# Patient Record
Sex: FEMALE | Race: WHITE | NOT HISPANIC OR LATINO | Employment: OTHER | URBAN - METROPOLITAN AREA
[De-identification: names, ages, dates, MRNs, and addresses within clinical notes are randomized per-mention and may not be internally consistent; named-entity substitution may affect disease eponyms.]

---

## 2023-08-09 LAB
ALANINE AMINOTRANSFERASE (SGPT) (U/L) IN SER/PLAS: 20 U/L (ref 7–45)
ALBUMIN (G/DL) IN SER/PLAS: 4.4 G/DL (ref 3.4–5)
ALKALINE PHOSPHATASE (U/L) IN SER/PLAS: 54 U/L (ref 33–136)
ANION GAP IN SER/PLAS: 14 MMOL/L (ref 10–20)
ASPARTATE AMINOTRANSFERASE (SGOT) (U/L) IN SER/PLAS: 21 U/L (ref 9–39)
BASOPHILS (10*3/UL) IN BLOOD BY AUTOMATED COUNT: 0.07 X10E9/L (ref 0–0.1)
BASOPHILS/100 LEUKOCYTES IN BLOOD BY AUTOMATED COUNT: 1.4 % (ref 0–2)
BILIRUBIN TOTAL (MG/DL) IN SER/PLAS: 0.8 MG/DL (ref 0–1.2)
CALCIDIOL (25 OH VITAMIN D3) (NG/ML) IN SER/PLAS: 76 NG/ML
CALCIUM (MG/DL) IN SER/PLAS: 10.3 MG/DL (ref 8.6–10.3)
CARBON DIOXIDE, TOTAL (MMOL/L) IN SER/PLAS: 30 MMOL/L (ref 21–32)
CHLORIDE (MMOL/L) IN SER/PLAS: 98 MMOL/L (ref 98–107)
CHOLESTEROL (MG/DL) IN SER/PLAS: 152 MG/DL (ref 0–199)
CHOLESTEROL IN HDL (MG/DL) IN SER/PLAS: 83.5 MG/DL
CHOLESTEROL/HDL RATIO: 1.8
CREATININE (MG/DL) IN SER/PLAS: 0.92 MG/DL (ref 0.5–1.05)
EOSINOPHILS (10*3/UL) IN BLOOD BY AUTOMATED COUNT: 0.19 X10E9/L (ref 0–0.4)
EOSINOPHILS/100 LEUKOCYTES IN BLOOD BY AUTOMATED COUNT: 3.9 % (ref 0–6)
ERYTHROCYTE DISTRIBUTION WIDTH (RATIO) BY AUTOMATED COUNT: 14.6 % (ref 11.5–14.5)
ERYTHROCYTE MEAN CORPUSCULAR HEMOGLOBIN CONCENTRATION (G/DL) BY AUTOMATED: 33 G/DL (ref 32–36)
ERYTHROCYTE MEAN CORPUSCULAR VOLUME (FL) BY AUTOMATED COUNT: 91 FL (ref 80–100)
ERYTHROCYTES (10*6/UL) IN BLOOD BY AUTOMATED COUNT: 4.28 X10E12/L (ref 4–5.2)
GFR FEMALE: 65 ML/MIN/1.73M2
GLUCOSE (MG/DL) IN SER/PLAS: 99 MG/DL (ref 74–99)
HEMATOCRIT (%) IN BLOOD BY AUTOMATED COUNT: 39.1 % (ref 36–46)
HEMOGLOBIN (G/DL) IN BLOOD: 12.9 G/DL (ref 12–16)
IMMATURE GRANULOCYTES/100 LEUKOCYTES IN BLOOD BY AUTOMATED COUNT: 0.2 % (ref 0–0.9)
LDL: 56 MG/DL (ref 0–99)
LEUKOCYTES (10*3/UL) IN BLOOD BY AUTOMATED COUNT: 4.9 X10E9/L (ref 4.4–11.3)
LYMPHOCYTES (10*3/UL) IN BLOOD BY AUTOMATED COUNT: 1.05 X10E9/L (ref 0.8–3)
LYMPHOCYTES/100 LEUKOCYTES IN BLOOD BY AUTOMATED COUNT: 21.6 % (ref 13–44)
MONOCYTES (10*3/UL) IN BLOOD BY AUTOMATED COUNT: 0.54 X10E9/L (ref 0.05–0.8)
MONOCYTES/100 LEUKOCYTES IN BLOOD BY AUTOMATED COUNT: 11.1 % (ref 2–10)
NEUTROPHILS (10*3/UL) IN BLOOD BY AUTOMATED COUNT: 3.01 X10E9/L (ref 1.6–5.5)
NEUTROPHILS/100 LEUKOCYTES IN BLOOD BY AUTOMATED COUNT: 61.8 % (ref 40–80)
PLATELETS (10*3/UL) IN BLOOD AUTOMATED COUNT: 150 X10E9/L (ref 150–450)
POTASSIUM (MMOL/L) IN SER/PLAS: 3.6 MMOL/L (ref 3.5–5.3)
PROTEIN TOTAL: 7.3 G/DL (ref 6.4–8.2)
SODIUM (MMOL/L) IN SER/PLAS: 138 MMOL/L (ref 136–145)
THYROTROPIN (MIU/L) IN SER/PLAS BY DETECTION LIMIT <= 0.05 MIU/L: 0.62 MIU/L (ref 0.44–3.98)
TRIGLYCERIDE (MG/DL) IN SER/PLAS: 65 MG/DL (ref 0–149)
UREA NITROGEN (MG/DL) IN SER/PLAS: 20 MG/DL (ref 6–23)
VLDL: 13 MG/DL (ref 0–40)

## 2023-08-10 LAB
APPEARANCE, URINE: CLEAR
BACTERIA, URINE: ABNORMAL /HPF
BILIRUBIN, URINE: NEGATIVE
BLOOD, URINE: NEGATIVE
COLOR, URINE: YELLOW
GLUCOSE, URINE: NEGATIVE MG/DL
KETONES, URINE: NEGATIVE MG/DL
LEUKOCYTE ESTERASE, URINE: ABNORMAL
MUCUS, URINE: ABNORMAL /LPF
NITRITE, URINE: NEGATIVE
PH, URINE: 6 (ref 5–8)
PROTEIN, URINE: NEGATIVE MG/DL
RBC, URINE: 2 /HPF (ref 0–5)
SPECIFIC GRAVITY, URINE: 1.01 (ref 1–1.03)
SQUAMOUS EPITHELIAL CELLS, URINE: <1 /HPF
UROBILINOGEN, URINE: <2 MG/DL (ref 0–1.9)
WBC, URINE: 36 /HPF (ref 0–5)

## 2023-12-28 RX ORDER — IPRATROPIUM BROMIDE AND ALBUTEROL 20; 100 UG/1; UG/1
SPRAY, METERED RESPIRATORY (INHALATION)
COMMUNITY
Start: 2017-03-13

## 2023-12-28 RX ORDER — ATORVASTATIN CALCIUM 40 MG/1
1 TABLET, FILM COATED ORAL NIGHTLY
COMMUNITY
Start: 2016-12-30 | End: 2024-02-19 | Stop reason: SDUPTHER

## 2023-12-28 RX ORDER — MUPIROCIN 20 MG/G
OINTMENT TOPICAL
COMMUNITY
Start: 2021-08-04

## 2023-12-28 RX ORDER — FLUTICASONE FUROATE AND VILANTEROL 100; 25 UG/1; UG/1
1 POWDER RESPIRATORY (INHALATION) DAILY
COMMUNITY
Start: 2017-03-13

## 2023-12-28 RX ORDER — FLUTICASONE PROPIONATE 50 MCG
1 SPRAY, SUSPENSION (ML) NASAL 2 TIMES DAILY
COMMUNITY
Start: 2017-03-31

## 2023-12-28 RX ORDER — HYDROCHLOROTHIAZIDE 25 MG/1
1 TABLET ORAL DAILY
COMMUNITY
Start: 2022-06-13 | End: 2024-01-15

## 2023-12-28 RX ORDER — VITAMIN E MIXED 400 UNIT
1 CAPSULE ORAL DAILY
COMMUNITY

## 2023-12-28 RX ORDER — ISOSORBIDE MONONITRATE 30 MG/1
2 TABLET, EXTENDED RELEASE ORAL DAILY
COMMUNITY
Start: 2016-08-02

## 2023-12-28 RX ORDER — CARVEDILOL 25 MG/1
1 TABLET ORAL 2 TIMES DAILY
COMMUNITY
Start: 2021-08-03

## 2023-12-28 RX ORDER — VIT C/E/ZN/COPPR/LUTEIN/ZEAXAN 250MG-90MG
CAPSULE ORAL
COMMUNITY

## 2023-12-28 RX ORDER — IPRATROPIUM BROMIDE AND ALBUTEROL SULFATE 2.5; .5 MG/3ML; MG/3ML
SOLUTION RESPIRATORY (INHALATION)
COMMUNITY
Start: 2017-02-06

## 2023-12-28 RX ORDER — LOSARTAN POTASSIUM 100 MG/1
1 TABLET ORAL DAILY
COMMUNITY

## 2023-12-28 RX ORDER — ASPIRIN 81 MG/1
1 TABLET ORAL DAILY
COMMUNITY

## 2023-12-28 RX ORDER — CALCIUM CARBONATE 600 MG
2 TABLET ORAL DAILY
COMMUNITY

## 2024-01-09 DIAGNOSIS — I10 ESSENTIAL HYPERTENSION: Primary | ICD-10-CM

## 2024-01-15 RX ORDER — HYDROCHLOROTHIAZIDE 25 MG/1
25 TABLET ORAL DAILY
Qty: 100 TABLET | Refills: 2 | Status: SHIPPED | OUTPATIENT
Start: 2024-01-15 | End: 2024-02-19 | Stop reason: SDUPTHER

## 2024-02-19 ENCOUNTER — OFFICE VISIT (OUTPATIENT)
Dept: CARDIOLOGY | Facility: CLINIC | Age: 74
End: 2024-02-19
Payer: MEDICARE

## 2024-02-19 VITALS
HEIGHT: 63 IN | DIASTOLIC BLOOD PRESSURE: 72 MMHG | SYSTOLIC BLOOD PRESSURE: 146 MMHG | HEART RATE: 62 BPM | WEIGHT: 158 LBS | BODY MASS INDEX: 28 KG/M2

## 2024-02-19 DIAGNOSIS — I10 ESSENTIAL HYPERTENSION, BENIGN: Primary | ICD-10-CM

## 2024-02-19 DIAGNOSIS — J44.9 CHRONIC OBSTRUCTIVE PULMONARY DISEASE, UNSPECIFIED COPD TYPE (MULTI): ICD-10-CM

## 2024-02-19 DIAGNOSIS — J45.909 ASTHMA, UNSPECIFIED ASTHMA SEVERITY, UNSPECIFIED WHETHER COMPLICATED, UNSPECIFIED WHETHER PERSISTENT (HHS-HCC): ICD-10-CM

## 2024-02-19 DIAGNOSIS — I10 ESSENTIAL HYPERTENSION: ICD-10-CM

## 2024-02-19 DIAGNOSIS — I51.81 TAKOTSUBO CARDIOMYOPATHY: ICD-10-CM

## 2024-02-19 DIAGNOSIS — Z87.891 FORMER SMOKER: ICD-10-CM

## 2024-02-19 DIAGNOSIS — E78.2 MIXED HYPERLIPIDEMIA: ICD-10-CM

## 2024-02-19 PROCEDURE — 3078F DIAST BP <80 MM HG: CPT | Performed by: INTERNAL MEDICINE

## 2024-02-19 PROCEDURE — 99214 OFFICE O/P EST MOD 30 MIN: CPT | Performed by: INTERNAL MEDICINE

## 2024-02-19 PROCEDURE — 1159F MED LIST DOCD IN RCRD: CPT | Performed by: INTERNAL MEDICINE

## 2024-02-19 PROCEDURE — 1036F TOBACCO NON-USER: CPT | Performed by: INTERNAL MEDICINE

## 2024-02-19 PROCEDURE — 3077F SYST BP >= 140 MM HG: CPT | Performed by: INTERNAL MEDICINE

## 2024-02-19 RX ORDER — AMLODIPINE BESYLATE 5 MG/1
5 TABLET ORAL DAILY
Qty: 30 TABLET | Refills: 11 | Status: SHIPPED | OUTPATIENT
Start: 2024-02-19 | End: 2025-02-18

## 2024-02-19 RX ORDER — HYDROCHLOROTHIAZIDE 25 MG/1
25 TABLET ORAL DAILY
Qty: 100 TABLET | Refills: 2 | Status: SHIPPED | OUTPATIENT
Start: 2024-02-19

## 2024-02-19 RX ORDER — ATORVASTATIN CALCIUM 40 MG/1
40 TABLET, FILM COATED ORAL NIGHTLY
Qty: 90 TABLET | Refills: 3 | Status: SHIPPED | OUTPATIENT
Start: 2024-02-19 | End: 2025-02-18

## 2024-02-19 NOTE — PATIENT INSTRUCTIONS
START AMLODIPINE 5 MG DAILY    FOLLOW UP IN 3-4 WEEKS WITH NP FOR BP CHECK    FOLLOW UP IN 6 MONTHS WITH DR BULL

## 2024-02-19 NOTE — PROGRESS NOTES
Patient:  Gi Moss  YOB: 1950  MRN: [id number]       Chief Complaint/Active Symptoms:       Gi Moss is a 73 y.o. female who returns today for cardiac follow-up.  Patient denies any new unusual symptoms.  She is on her usual medicines.  Her blood pressure still remains elevated however.  We are going to begin on Norvasc 5 mg for better control.  Will have a blood pressure check done in a few weeks and then see me in 6 months.  She has no other complaints.  She does wear her oxygen full-time due to her end-stage lung disease.      Objective:     Vitals:    02/19/24 1225   BP: 146/72   Pulse: 62       Vitals:    02/19/24 1225   Weight: 71.7 kg (158 lb)       Allergies:     Allergies   Allergen Reactions    Lisinopril Unknown          Medications:     Current Outpatient Medications   Medication Instructions    aspirin 81 mg EC tablet 1 tablet, oral, Daily    atorvastatin (Lipitor) 40 mg tablet 1 tablet, oral, Nightly    calcium carbonate 600 mg calcium (1,500 mg) tablet 2 tablets, oral, Daily    carvedilol (Coreg) 25 mg tablet 1 tablet, oral, 2 times daily    cholecalciferol (Vitamin D-3) 25 MCG (1000 UT) capsule oral    fish oil concentrate (Omega-3) 120-180 mg capsule 1 capsule, oral, 2 times daily    fluticasone (Flonase) 50 mcg/actuation nasal spray 1 spray, nasal, 2 times daily    fluticasone furoate-vilanteroL (Breo Ellipta) 100-25 mcg/dose inhaler 1 puff, inhalation, Daily    hydroCHLOROthiazide (HYDRODIURIL) 25 mg, oral, Daily    ipratropium-albuteroL (Combivent Respimat)  mcg/actuation inhaler inhalation    ipratropium-albuteroL (Duo-Neb) 0.5-2.5 mg/3 mL nebulizer solution inhalation    isosorbide mononitrate ER (Imdur) 30 mg 24 hr tablet 2 tablets, oral, Daily    losartan (Cozaar) 100 mg tablet 1 tablet, oral, Daily    mupirocin (Bactroban) 2 % ointment as directed prn    vitamin E 90 mg (200 unit) capsule 1 capsule, oral, Daily       Physical Examination:   Constitutional:        "Appearance: Healthy appearance. Not in distress.   Neck:      Vascular: No JVR. JVD normal.   Pulmonary:      Effort: Pulmonary effort is normal.      Breath sounds: Normal breath sounds. No wheezing. No rhonchi. No rales.   Chest:      Chest wall: Not tender to palpatation.   Cardiovascular:      PMI at left midclavicular line. Normal rate. Regular rhythm. Normal S1. Normal S2.       Murmurs: There is no murmur.      No gallop.  No click. No rub.   Pulses:     Intact distal pulses.   Edema:     Peripheral edema absent.   Abdominal:      General: Bowel sounds are normal.      Palpations: Abdomen is soft.      Tenderness: There is no abdominal tenderness.   Musculoskeletal: Normal range of motion.         General: No tenderness. Skin:     General: Skin is warm and dry.   Neurological:      General: No focal deficit present.      Mental Status: Alert and oriented to person, place and time.            Lab:     CBC:   Lab Results   Component Value Date    WBC 4.9 08/09/2023    RBC 4.28 08/09/2023    HGB 12.9 08/09/2023    HCT 39.1 08/09/2023     08/09/2023        CMP:    Lab Results   Component Value Date     08/09/2023    K 3.6 08/09/2023    CL 98 08/09/2023    CO2 30 08/09/2023    BUN 20 08/09/2023    CREATININE 0.92 08/09/2023    GLUCOSE 99 08/09/2023    CALCIUM 10.3 08/09/2023       Magnesium:    No results found for: \"MG\"    Lipid Profile:    Lab Results   Component Value Date    TRIG 65 08/09/2023    HDL 83.5 08/09/2023       TSH:    Lab Results   Component Value Date    TSH 0.62 08/09/2023       BNP:   No results found for: \"BNP\"     PT/INR:    No results found for: \"PROTIME\", \"INR\"    HgBA1c:    No results found for: \"HGBA1C\"    BMP:  Lab Results   Component Value Date     08/09/2023     05/21/2022     02/22/2021    K 3.6 08/09/2023    K 3.4 (L) 05/21/2022    K 3.7 02/22/2021    CL 98 08/09/2023    CL 98 05/21/2022     02/22/2021    CO2 30 08/09/2023    CO2 29 05/21/2022    " "CO2 27 02/22/2021    BUN 20 08/09/2023    BUN 22 05/21/2022    BUN 28 (H) 02/22/2021    CREATININE 0.92 08/09/2023    CREATININE 1.11 (H) 05/21/2022    CREATININE 1.10 (H) 02/22/2021       CBC:  Lab Results   Component Value Date    WBC 4.9 08/09/2023    WBC 5.8 05/21/2022    WBC 6.5 02/22/2021    RBC 4.28 08/09/2023    RBC 4.59 05/21/2022    RBC 4.28 02/22/2021    HGB 12.9 08/09/2023    HGB 13.6 05/21/2022    HGB 12.5 02/22/2021    HCT 39.1 08/09/2023    HCT 41.2 05/21/2022    HCT 39.3 02/22/2021    MCV 91 08/09/2023    MCV 90 05/21/2022    MCV 92 02/22/2021    MCHC 33.0 08/09/2023    MCHC 33.0 05/21/2022    MCHC 31.8 (L) 02/22/2021    RDW 14.6 (H) 08/09/2023    RDW 14.8 (H) 05/21/2022    RDW 15.1 (H) 02/22/2021     08/09/2023     05/21/2022     02/22/2021       Cardiac Enzymes:    No results found for: \"TROPHS\"    Hepatic Function Panel:    Lab Results   Component Value Date    ALKPHOS 54 08/09/2023    ALT 20 08/09/2023    AST 21 08/09/2023    PROT 7.3 08/09/2023    BILITOT 0.8 08/09/2023         Diagnostic Studies:     Patient was never admitted.    EKG:   No results found for: \"EKG\"      Radiology:     No orders to display       Assessment/Plan:         There is no problem list on file for this patient.        ASSESSMENT         73-year-old female here for follow-up. Has a remote history of Takotsubo cardiomyopathy which resolved. She has end-stage COPD and is on full-time oxygen therapy. She had a recent echo which confirms normal LV function. She does have pulmonary hypertension and some mild valve regurgitation. She has no new or unusual symptoms otherwise. She is on her usual medicines without difficulties.     Meds, vitals, examination as noted.     Chart reviewed in detail discussed the patient at length.     Impression:  Resolved Tacotsubo cardiomyopathy  Severe COPD  Hypertension which is labile   Oxygen dependency  Former smoker  Obesity  Dyslipidemia.     Recommendation:  Usual " cardiac meds to continue with the addition of Norvasc 5 daily  Follow-up with pulmonary medicine  See me at 6-month intervals  Call if any problems or issues otherwise arise       Blood pressure check will be obtained by nurse america in 3 to 4 weeks

## 2024-03-18 ENCOUNTER — OFFICE VISIT (OUTPATIENT)
Dept: PRIMARY CARE | Facility: CLINIC | Age: 74
End: 2024-03-18
Payer: MEDICARE

## 2024-03-18 VITALS
WEIGHT: 157.8 LBS | TEMPERATURE: 97.4 F | BODY MASS INDEX: 27.96 KG/M2 | SYSTOLIC BLOOD PRESSURE: 110 MMHG | HEIGHT: 63 IN | HEART RATE: 72 BPM | OXYGEN SATURATION: 98 % | DIASTOLIC BLOOD PRESSURE: 64 MMHG

## 2024-03-18 DIAGNOSIS — L98.499 CHRONIC SKIN ULCER, UNSPECIFIED ULCER STAGE (MULTI): ICD-10-CM

## 2024-03-18 DIAGNOSIS — Z00.00 ENCOUNTER FOR WELLNESS EXAMINATION: ICD-10-CM

## 2024-03-18 DIAGNOSIS — R39.9 URINARY TRACT INFECTION SYMPTOMS: ICD-10-CM

## 2024-03-18 DIAGNOSIS — E55.9 VITAMIN D INSUFFICIENCY: ICD-10-CM

## 2024-03-18 DIAGNOSIS — I20.9 ANGINA PECTORIS (CMS-HCC): ICD-10-CM

## 2024-03-18 DIAGNOSIS — E78.2 MIXED DYSLIPIDEMIA: ICD-10-CM

## 2024-03-18 DIAGNOSIS — I10 ESSENTIAL (PRIMARY) HYPERTENSION: Primary | ICD-10-CM

## 2024-03-18 PROCEDURE — 3074F SYST BP LT 130 MM HG: CPT | Performed by: INTERNAL MEDICINE

## 2024-03-18 PROCEDURE — 99214 OFFICE O/P EST MOD 30 MIN: CPT | Performed by: INTERNAL MEDICINE

## 2024-03-18 PROCEDURE — 3078F DIAST BP <80 MM HG: CPT | Performed by: INTERNAL MEDICINE

## 2024-03-18 PROCEDURE — 1159F MED LIST DOCD IN RCRD: CPT | Performed by: INTERNAL MEDICINE

## 2024-03-18 PROCEDURE — 1036F TOBACCO NON-USER: CPT | Performed by: INTERNAL MEDICINE

## 2024-03-18 ASSESSMENT — ENCOUNTER SYMPTOMS
HEMATURIA: 0
FEVER: 0
OCCASIONAL FEELINGS OF UNSTEADINESS: 1
BLOOD IN STOOL: 0
STRIDOR: 0
PALPITATIONS: 0
EYE REDNESS: 0
LIGHT-HEADEDNESS: 0
LOSS OF SENSATION IN FEET: 0
CHEST TIGHTNESS: 0
SPEECH DIFFICULTY: 0
JOINT SWELLING: 0
DEPRESSION: 0
ACTIVITY CHANGE: 0

## 2024-03-18 ASSESSMENT — PATIENT HEALTH QUESTIONNAIRE - PHQ9
1. LITTLE INTEREST OR PLEASURE IN DOING THINGS: NOT AT ALL
2. FEELING DOWN, DEPRESSED OR HOPELESS: NOT AT ALL
SUM OF ALL RESPONSES TO PHQ9 QUESTIONS 1 AND 2: 0

## 2024-03-18 NOTE — PROGRESS NOTES
"CHIEF COMPLAINT:    Gi Moss is a 73 y.o. female who presents for Follow-up (Patient is in office today for a 6 month follow-up ).    HISTORY OF PRESENT ILLNESS:  Gi Moss  is a pleasant 73-year-old female comes here to have seen for follow-up.  Overall she is doing well.  She has COPD for which she is on supplemental oxygen.  She denies any cough or, congestion.  She sees Dr. Chase for cardiac health.  Patient has history of apical hypokinesia which is at normal now.  Patient does not have any acute medical complaint.  Her blood pressure is slightly low.  She just have started amlodipine 5 mg.  She has a nurses visit for blood pressure check at her cardiology office.  Her office blood pressure today was 110/64 millimeters mercury.  She does not have any lightheadedness, chest pain or blurry vision.  Initially with the starting of amlodipine she did feel lightheaded.        Review of Systems   Constitutional:  Negative for activity change and fever.   HENT:  Negative for hearing loss, nosebleeds and tinnitus.    Eyes:  Negative for redness.   Respiratory:  Negative for chest tightness and stridor.    Cardiovascular:  Negative for chest pain, palpitations and leg swelling.   Gastrointestinal:  Negative for blood in stool.   Endocrine: Negative for cold intolerance.   Genitourinary:  Negative for hematuria.   Musculoskeletal:  Negative for joint swelling.   Skin:  Negative for rash.   Neurological:  Negative for speech difficulty and light-headedness.   Psychiatric/Behavioral:  Negative for behavioral problems.      Visit Vitals  /64 (BP Location: Right arm, Patient Position: Sitting, BP Cuff Size: Adult)   Pulse 72   Temp 36.3 °C (97.4 °F) (Temporal)   Ht 1.588 m (5' 2.5\")   Wt 71.6 kg (157 lb 12.8 oz)   SpO2 98% Comment: 3L   BMI 28.40 kg/m²   Smoking Status Former   BSA 1.78 m²         Wt Readings from Last 10 Encounters:   03/18/24 71.6 kg (157 lb 12.8 oz)   02/19/24 71.7 kg (158 lb)   09/18/23 " 71.7 kg (158 lb)   08/21/23 71.9 kg (158 lb 7 oz)   08/17/23 72.1 kg (159 lb)   02/20/23 71.9 kg (158 lb 9 oz)   02/13/23 72.6 kg (160 lb)   09/14/22 71.8 kg (158 lb 6.4 oz)   05/17/22 71.6 kg (157 lb 14.4 oz)   03/02/22 82.1 kg (181 lb)       Physical Exam  Constitutional:       General: She is not in acute distress.     Appearance: Normal appearance.   HENT:      Head: Normocephalic.      Right Ear: Tympanic membrane normal.      Left Ear: Tympanic membrane normal.      Mouth/Throat:      Mouth: Mucous membranes are moist.   Cardiovascular:      Rate and Rhythm: Normal rate and regular rhythm.      Heart sounds: No murmur heard.  Pulmonary:      Effort: No respiratory distress.   Abdominal:      Palpations: Abdomen is soft.   Musculoskeletal:      Cervical back: Neck supple.      Right lower leg: No edema.      Left lower leg: No edema.   Skin:     Findings: No rash.   Neurological:      General: No focal deficit present.      Mental Status: She is alert and oriented to person, place, and time.   Psychiatric:         Mood and Affect: Mood normal.        RECENT LABS:  Lab Results   Component Value Date    WBC 4.9 08/09/2023    HGB 12.9 08/09/2023    HCT 39.1 08/09/2023     08/09/2023    CHOL 152 08/09/2023    TRIG 65 08/09/2023    HDL 83.5 08/09/2023    ALT 20 08/09/2023    AST 21 08/09/2023     08/09/2023    K 3.6 08/09/2023    CL 98 08/09/2023    CREATININE 0.92 08/09/2023    BUN 20 08/09/2023    CO2 30 08/09/2023    TSH 0.62 08/09/2023     IMAGING:        Reviewed:   MEDICATIONS:   Current Outpatient Medications   Medication Instructions    amLODIPine (NORVASC) 5 mg, oral, Daily    aspirin 81 mg EC tablet 1 tablet, oral, Daily    atorvastatin (LIPITOR) 40 mg, oral, Nightly    calcium carbonate 600 mg calcium (1,500 mg) tablet 2 tablets, oral, Daily    carvedilol (Coreg) 25 mg tablet 1 tablet, oral, 2 times daily    cholecalciferol (Vitamin D-3) 25 MCG (1000 UT) capsule oral    fish oil concentrate  (Omega-3) 120-180 mg capsule 1 capsule, oral, 2 times daily    fluticasone (Flonase) 50 mcg/actuation nasal spray 1 spray, nasal, 2 times daily    fluticasone furoate-vilanteroL (Breo Ellipta) 100-25 mcg/dose inhaler 1 puff, inhalation, Daily    hydroCHLOROthiazide (HYDRODIURIL) 25 mg, oral, Daily    ipratropium-albuteroL (Combivent Respimat)  mcg/actuation inhaler inhalation    ipratropium-albuteroL (Duo-Neb) 0.5-2.5 mg/3 mL nebulizer solution inhalation    isosorbide mononitrate ER (Imdur) 30 mg 24 hr tablet 2 tablets, oral, Daily    losartan (Cozaar) 100 mg tablet 1 tablet, oral, Daily    mupirocin (Bactroban) 2 % ointment as directed prn    vitamin E 90 mg (200 unit) capsule 1 capsule, oral, Daily      TODAY'S VISIT  DX:   1. Chronic skin ulcer, unspecified ulcer stage (CMS/HCC)        2. Angina pectoris (CMS/HCC)        3. Essential (primary) hypertension  CBC and Auto Differential      4. Encounter for wellness examination  Comprehensive Metabolic Panel    Lipid Panel    TSH with reflex to Free T4 if abnormal    Urinalysis with Reflex Microscopic      5. Mixed dyslipidemia  Lipid Panel      6. Vitamin D insufficiency  Vitamin D 25-Hydroxy,Total (for eval of Vitamin D levels)      7. Urinary tract infection symptoms  Urinalysis with Reflex Microscopic         MEDICAL DECISION MAKING:  - Recent lab work and relevant imaging studies have been reviewed.    - The current active medical co morbidities have been considered.   - Relevant correspondence/notes from specialty consultants were reviewed and discussed with patient.    - Patient was given treatment as per above plan.   - Patient will continue with current medical therapy and plan.   - Medication have been sent for refill.    - Next Follow up in 6 months. .

## 2024-03-21 ENCOUNTER — OFFICE VISIT (OUTPATIENT)
Dept: CARDIOLOGY | Facility: CLINIC | Age: 74
End: 2024-03-21
Payer: MEDICARE

## 2024-03-21 VITALS
SYSTOLIC BLOOD PRESSURE: 98 MMHG | WEIGHT: 158.8 LBS | BODY MASS INDEX: 28.14 KG/M2 | HEIGHT: 63 IN | HEART RATE: 62 BPM | DIASTOLIC BLOOD PRESSURE: 62 MMHG

## 2024-03-21 DIAGNOSIS — I10 ESSENTIAL HYPERTENSION, BENIGN: Primary | ICD-10-CM

## 2024-03-21 PROCEDURE — 3078F DIAST BP <80 MM HG: CPT | Performed by: NURSE PRACTITIONER

## 2024-03-21 PROCEDURE — 99214 OFFICE O/P EST MOD 30 MIN: CPT | Performed by: NURSE PRACTITIONER

## 2024-03-21 PROCEDURE — 3074F SYST BP LT 130 MM HG: CPT | Performed by: NURSE PRACTITIONER

## 2024-03-21 PROCEDURE — 1036F TOBACCO NON-USER: CPT | Performed by: NURSE PRACTITIONER

## 2024-03-21 PROCEDURE — 1159F MED LIST DOCD IN RCRD: CPT | Performed by: NURSE PRACTITIONER

## 2024-03-21 ASSESSMENT — ENCOUNTER SYMPTOMS
ABDOMINAL PAIN: 0
EYES NEGATIVE: 1
HEMATOLOGIC/LYMPHATIC NEGATIVE: 1
CLAUDICATION: 0
SHORTNESS OF BREATH: 0
NAIL CHANGES: 0
NUMBNESS: 0
WHEEZING: 0
ORTHOPNEA: 0
DIZZINESS: 0
UNUSUAL HAIR DISTRIBUTION: 0
VOMITING: 0
DECREASED APPETITE: 0
MUSCULOSKELETAL NEGATIVE: 1
CHILLS: 0
ALLERGIC/IMMUNOLOGIC NEGATIVE: 1
PSYCHIATRIC NEGATIVE: 1
COLOR CHANGE: 0
CONSTIPATION: 0
DIAPHORESIS: 0
FEVER: 0
IRREGULAR HEARTBEAT: 0
SYNCOPE: 0
POOR WOUND HEALING: 0
COUGH: 0
HEMOPTYSIS: 0
ENDOCRINE NEGATIVE: 1
PALPITATIONS: 0
NEAR-SYNCOPE: 0
DYSPNEA ON EXERTION: 1
DIARRHEA: 0
LIGHT-HEADEDNESS: 0
CONSTITUTIONAL NEGATIVE: 1
NEUROLOGICAL NEGATIVE: 1
VISUAL HALOS: 0
SPUTUM PRODUCTION: 0
SUSPICIOUS LESIONS: 0
NAUSEA: 0
PND: 0

## 2024-03-21 NOTE — PATIENT INSTRUCTIONS
Follow-up with Dr. ALEXA FARIAS today in 6 months as scheduled  Please get lab work as ordered in the next 1 week

## 2024-07-18 ENCOUNTER — LAB (OUTPATIENT)
Dept: LAB | Facility: LAB | Age: 74
End: 2024-07-18
Payer: MEDICARE

## 2024-07-18 DIAGNOSIS — R39.9 URINARY TRACT INFECTION SYMPTOMS: ICD-10-CM

## 2024-07-18 DIAGNOSIS — E55.9 VITAMIN D INSUFFICIENCY: ICD-10-CM

## 2024-07-18 DIAGNOSIS — Z00.00 ENCOUNTER FOR WELLNESS EXAMINATION: ICD-10-CM

## 2024-07-18 DIAGNOSIS — E78.2 MIXED DYSLIPIDEMIA: ICD-10-CM

## 2024-07-18 DIAGNOSIS — I10 ESSENTIAL (PRIMARY) HYPERTENSION: ICD-10-CM

## 2024-07-18 LAB
25(OH)D3 SERPL-MCNC: 82 NG/ML (ref 30–100)
ALBUMIN SERPL BCP-MCNC: 4.3 G/DL (ref 3.4–5)
ALP SERPL-CCNC: 56 U/L (ref 33–136)
ALT SERPL W P-5'-P-CCNC: 18 U/L (ref 7–45)
ANION GAP SERPL CALC-SCNC: 12 MMOL/L (ref 10–20)
APPEARANCE UR: CLEAR
AST SERPL W P-5'-P-CCNC: 18 U/L (ref 9–39)
BACTERIA #/AREA URNS AUTO: ABNORMAL /HPF
BASOPHILS # BLD AUTO: 0.07 X10*3/UL (ref 0–0.1)
BASOPHILS NFR BLD AUTO: 1.3 %
BILIRUB SERPL-MCNC: 0.9 MG/DL (ref 0–1.2)
BILIRUB UR STRIP.AUTO-MCNC: NEGATIVE MG/DL
BUN SERPL-MCNC: 24 MG/DL (ref 6–23)
CALCIUM SERPL-MCNC: 10.5 MG/DL (ref 8.6–10.3)
CHLORIDE SERPL-SCNC: 98 MMOL/L (ref 98–107)
CHOLEST SERPL-MCNC: 153 MG/DL (ref 0–199)
CHOLESTEROL/HDL RATIO: 1.9
CO2 SERPL-SCNC: 31 MMOL/L (ref 21–32)
COLOR UR: YELLOW
CREAT SERPL-MCNC: 1 MG/DL (ref 0.5–1.05)
EGFRCR SERPLBLD CKD-EPI 2021: 59 ML/MIN/1.73M*2
EOSINOPHIL # BLD AUTO: 0.18 X10*3/UL (ref 0–0.4)
EOSINOPHIL NFR BLD AUTO: 3.3 %
ERYTHROCYTE [DISTWIDTH] IN BLOOD BY AUTOMATED COUNT: 14.6 % (ref 11.5–14.5)
GLUCOSE SERPL-MCNC: 82 MG/DL (ref 74–99)
GLUCOSE UR STRIP.AUTO-MCNC: NORMAL MG/DL
HCT VFR BLD AUTO: 37.9 % (ref 36–46)
HDLC SERPL-MCNC: 82.1 MG/DL
HGB BLD-MCNC: 12.2 G/DL (ref 12–16)
IMM GRANULOCYTES # BLD AUTO: 0.01 X10*3/UL (ref 0–0.5)
IMM GRANULOCYTES NFR BLD AUTO: 0.2 % (ref 0–0.9)
KETONES UR STRIP.AUTO-MCNC: NEGATIVE MG/DL
LDLC SERPL CALC-MCNC: 60 MG/DL
LEUKOCYTE ESTERASE UR QL STRIP.AUTO: ABNORMAL
LYMPHOCYTES # BLD AUTO: 1.07 X10*3/UL (ref 0.8–3)
LYMPHOCYTES NFR BLD AUTO: 19.9 %
MCH RBC QN AUTO: 30 PG (ref 26–34)
MCHC RBC AUTO-ENTMCNC: 32.2 G/DL (ref 32–36)
MCV RBC AUTO: 93 FL (ref 80–100)
MONOCYTES # BLD AUTO: 0.62 X10*3/UL (ref 0.05–0.8)
MONOCYTES NFR BLD AUTO: 11.5 %
NEUTROPHILS # BLD AUTO: 3.44 X10*3/UL (ref 1.6–5.5)
NEUTROPHILS NFR BLD AUTO: 63.8 %
NITRITE UR QL STRIP.AUTO: NEGATIVE
NON HDL CHOLESTEROL: 71 MG/DL (ref 0–149)
NRBC BLD-RTO: 0 /100 WBCS (ref 0–0)
PH UR STRIP.AUTO: 7.5 [PH]
PLATELET # BLD AUTO: 152 X10*3/UL (ref 150–450)
POTASSIUM SERPL-SCNC: 3.8 MMOL/L (ref 3.5–5.3)
PROT SERPL-MCNC: 6.8 G/DL (ref 6.4–8.2)
PROT UR STRIP.AUTO-MCNC: NEGATIVE MG/DL
RBC # BLD AUTO: 4.06 X10*6/UL (ref 4–5.2)
RBC # UR STRIP.AUTO: NEGATIVE /UL
RBC #/AREA URNS AUTO: ABNORMAL /HPF
SODIUM SERPL-SCNC: 137 MMOL/L (ref 136–145)
SP GR UR STRIP.AUTO: 1.01
TRIGL SERPL-MCNC: 53 MG/DL (ref 0–149)
TSH SERPL-ACNC: 0.48 MIU/L (ref 0.44–3.98)
UROBILINOGEN UR STRIP.AUTO-MCNC: NORMAL MG/DL
VLDL: 11 MG/DL (ref 0–40)
WBC # BLD AUTO: 5.4 X10*3/UL (ref 4.4–11.3)
WBC #/AREA URNS AUTO: ABNORMAL /HPF

## 2024-07-18 PROCEDURE — 82306 VITAMIN D 25 HYDROXY: CPT

## 2024-07-18 PROCEDURE — 80053 COMPREHEN METABOLIC PANEL: CPT

## 2024-07-18 PROCEDURE — 36415 COLL VENOUS BLD VENIPUNCTURE: CPT

## 2024-07-18 PROCEDURE — 84443 ASSAY THYROID STIM HORMONE: CPT

## 2024-07-18 PROCEDURE — 85025 COMPLETE CBC W/AUTO DIFF WBC: CPT

## 2024-07-18 PROCEDURE — 80061 LIPID PANEL: CPT

## 2024-07-18 PROCEDURE — 81001 URINALYSIS AUTO W/SCOPE: CPT

## 2024-07-19 RX ORDER — NITROFURANTOIN 25; 75 MG/1; MG/1
100 CAPSULE ORAL 2 TIMES DAILY
Qty: 14 CAPSULE | Refills: 0 | Status: SHIPPED | OUTPATIENT
Start: 2024-07-19 | End: 2024-07-26

## 2024-08-06 ENCOUNTER — APPOINTMENT (OUTPATIENT)
Dept: PRIMARY CARE | Facility: CLINIC | Age: 74
End: 2024-08-06
Payer: MEDICARE

## 2024-08-06 VITALS
BODY MASS INDEX: 29.11 KG/M2 | DIASTOLIC BLOOD PRESSURE: 72 MMHG | SYSTOLIC BLOOD PRESSURE: 136 MMHG | OXYGEN SATURATION: 96 % | HEART RATE: 61 BPM | WEIGHT: 158.2 LBS | HEIGHT: 62 IN | TEMPERATURE: 97.6 F

## 2024-08-06 DIAGNOSIS — J45.909 ASTHMA, UNSPECIFIED ASTHMA SEVERITY, UNSPECIFIED WHETHER COMPLICATED, UNSPECIFIED WHETHER PERSISTENT (HHS-HCC): ICD-10-CM

## 2024-08-06 DIAGNOSIS — L24.89 IRRITANT CONTACT DERMATITIS DUE TO OTHER AGENTS: Primary | ICD-10-CM

## 2024-08-06 DIAGNOSIS — I51.81 TAKOTSUBO CARDIOMYOPATHY: ICD-10-CM

## 2024-08-06 PROCEDURE — 1158F ADVNC CARE PLAN TLK DOCD: CPT | Performed by: INTERNAL MEDICINE

## 2024-08-06 PROCEDURE — 99214 OFFICE O/P EST MOD 30 MIN: CPT | Performed by: INTERNAL MEDICINE

## 2024-08-06 PROCEDURE — 3078F DIAST BP <80 MM HG: CPT | Performed by: INTERNAL MEDICINE

## 2024-08-06 PROCEDURE — 3075F SYST BP GE 130 - 139MM HG: CPT | Performed by: INTERNAL MEDICINE

## 2024-08-06 PROCEDURE — 1123F ACP DISCUSS/DSCN MKR DOCD: CPT | Performed by: INTERNAL MEDICINE

## 2024-08-06 PROCEDURE — 1036F TOBACCO NON-USER: CPT | Performed by: INTERNAL MEDICINE

## 2024-08-06 PROCEDURE — 1159F MED LIST DOCD IN RCRD: CPT | Performed by: INTERNAL MEDICINE

## 2024-08-06 RX ORDER — METHYLPREDNISOLONE 4 MG/1
TABLET ORAL
Qty: 21 TABLET | Refills: 0 | Status: SHIPPED | OUTPATIENT
Start: 2024-08-06

## 2024-08-06 ASSESSMENT — ENCOUNTER SYMPTOMS
DEPRESSION: 0
BLOOD IN STOOL: 0
LIGHT-HEADEDNESS: 0
ACTIVITY CHANGE: 0
PALPITATIONS: 0
STRIDOR: 0
SPEECH DIFFICULTY: 0
FEVER: 0
HEMATURIA: 0
CHEST TIGHTNESS: 0
JOINT SWELLING: 0
EYE REDNESS: 0

## 2024-08-06 ASSESSMENT — PATIENT HEALTH QUESTIONNAIRE - PHQ9: 2. FEELING DOWN, DEPRESSED OR HOPELESS: NOT AT ALL

## 2024-08-07 DIAGNOSIS — I10 ESSENTIAL HYPERTENSION, BENIGN: ICD-10-CM

## 2024-08-07 RX ORDER — CARVEDILOL 25 MG/1
25 TABLET ORAL 2 TIMES DAILY
Qty: 60 TABLET | Refills: 0 | Status: SHIPPED | OUTPATIENT
Start: 2024-08-07 | End: 2024-09-06

## 2024-08-07 NOTE — TELEPHONE ENCOUNTER
Received request for prescription refill for patient.  Patient follows with Dr. Dorian Esposito, DO, [facility]     Request is for carvedilol  Is patient currently on medication- yes    Last OV- 3/21/24  Next OV- 8/19/24    Pended for signing and sent to provider.

## 2024-08-07 NOTE — PROGRESS NOTES
"CHIEF COMPLAINT:    Chief Complaint   Patient presents with    Follow-up     6 month f/u        HISTORY OF PRESENT ILLNESS:  Gi Moss  is a pleasant 73 y.o. female Comes here for follow-up.  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  She is doing well from the cardiac perspective.  She does have asthma and currently see O2 dependent at 2 to 3 L via nasal cannula.  She does have a portable oxygen canister.  Today she is here with severe itchiness in the lower right extremity.  She was cleaning her weeds, working in the garden.  She does not recall touching any poison ivy leaves.  However it has been very red and itchy in the lower third of the leg.  She had some a Bactroban cream which she used but did not get any result.      Review of Systems   Constitutional:  Negative for activity change and fever.   HENT:  Negative for hearing loss, nosebleeds and tinnitus.    Eyes:  Negative for redness.   Respiratory:  Negative for chest tightness and stridor.    Cardiovascular:  Negative for chest pain, palpitations and leg swelling.   Gastrointestinal:  Negative for blood in stool.   Endocrine: Negative for cold intolerance.   Genitourinary:  Negative for hematuria.   Musculoskeletal:  Negative for joint swelling.   Skin:  Negative for rash.   Neurological:  Negative for speech difficulty and light-headedness.   Psychiatric/Behavioral:  Negative for behavioral problems.      Visit Vitals  /72 (BP Location: Left arm, Patient Position: Sitting, BP Cuff Size: Adult)   Pulse 61   Temp 36.4 °C (97.6 °F) (Temporal)   Ht 1.575 m (5' 2\")   Wt 71.8 kg (158 lb 3.2 oz)   SpO2 96%   BMI 28.94 kg/m²   Smoking Status Former   BSA 1.77 m²         Wt Readings from Last 10 Encounters:   08/06/24 71.8 kg (158 lb 3.2 oz)   03/21/24 72 kg (158 lb 12.8 oz)   03/18/24 71.6 kg (157 lb 12.8 oz)   02/19/24 71.7 kg (158 lb)   09/18/23 71.7 kg (158 lb)   08/21/23 71.9 kg (158 lb 7 oz)   08/17/23 72.1 kg (159 lb) "   02/20/23 71.9 kg (158 lb 9 oz)   02/13/23 72.6 kg (160 lb)   09/14/22 71.8 kg (158 lb 6.4 oz)       Physical Exam  Constitutional:       General: She is not in acute distress.     Appearance: Normal appearance.   HENT:      Head: Normocephalic.      Right Ear: Tympanic membrane normal.      Left Ear: Tympanic membrane normal.      Mouth/Throat:      Mouth: Mucous membranes are moist.   Cardiovascular:      Rate and Rhythm: Normal rate and regular rhythm.      Heart sounds: No murmur heard.  Pulmonary:      Effort: No respiratory distress.   Abdominal:      Palpations: Abdomen is soft.   Musculoskeletal:      Cervical back: Neck supple.      Right lower leg: No edema.      Left lower leg: No edema.   Skin:     Findings: No rash.   Neurological:      General: No focal deficit present.      Mental Status: She is alert and oriented to person, place, and time.   Psychiatric:         Mood and Affect: Mood normal.        RECENT LABS:  Lab Results   Component Value Date    WBC 5.4 07/18/2024    HGB 12.2 07/18/2024    HCT 37.9 07/18/2024     07/18/2024    CHOL 153 07/18/2024    TRIG 53 07/18/2024    HDL 82.1 07/18/2024    ALT 18 07/18/2024    AST 18 07/18/2024     07/18/2024    K 3.8 07/18/2024    CL 98 07/18/2024    CREATININE 1.00 07/18/2024    BUN 24 (H) 07/18/2024    CO2 31 07/18/2024    TSH 0.48 07/18/2024     Lab Results   Component Value Date    GLUCOSE 82 07/18/2024    CALCIUM 10.5 (H) 07/18/2024     07/18/2024    K 3.8 07/18/2024    CO2 31 07/18/2024    CL 98 07/18/2024    BUN 24 (H) 07/18/2024    CREATININE 1.00 07/18/2024        MEDICATIONS:   Current Outpatient Medications   Medication Instructions    amLODIPine (NORVASC) 5 mg, oral, Daily    aspirin 81 mg EC tablet 1 tablet, oral, Daily    atorvastatin (LIPITOR) 40 mg, oral, Nightly    calcium carbonate 600 mg calcium (1,500 mg) tablet 2 tablets, oral, Daily    carvedilol (Coreg) 25 mg tablet 1 tablet, oral, 2 times daily    cholecalciferol  (Vitamin D-3) 25 MCG (1000 UT) capsule oral    fish oil concentrate (Omega-3) 120-180 mg capsule 1 capsule, oral, 2 times daily    fluticasone (Flonase) 50 mcg/actuation nasal spray 1 spray, nasal, 2 times daily    fluticasone furoate-vilanteroL (Breo Ellipta) 100-25 mcg/dose inhaler 1 puff, inhalation, Daily    hydroCHLOROthiazide (HYDRODIURIL) 25 mg, oral, Daily    ipratropium-albuteroL (Combivent Respimat)  mcg/actuation inhaler inhalation    ipratropium-albuteroL (Duo-Neb) 0.5-2.5 mg/3 mL nebulizer solution inhalation    isosorbide mononitrate ER (Imdur) 30 mg 24 hr tablet 2 tablets, oral, Daily    losartan (Cozaar) 100 mg tablet 1 tablet, oral, Daily    methylPREDNISolone (Medrol Dospak) 4 mg tablets Take as directed on package.    mupirocin (Bactroban) 2 % ointment as directed prn    vitamin E 90 mg (200 unit) capsule 1 capsule, oral, Daily        TODAY'S VISIT  DX:   1. Irritant contact dermatitis due to other agents  methylPREDNISolone (Medrol Dospak) 4 mg tablets      2. Asthma, unspecified asthma severity, unspecified whether complicated, unspecified whether persistent ([facility])  Currently on supplemental oxygen at 2 L.  She also uses inhaler as needed.      3. Takotsubo cardiomyopathy  Resolved.  Last ejection fraction  : 60%            MEDICAL DECISION MAKING:  - The current and active medical co morbidities have been considered.   - Recent lab work and relevant imaging studies have been reviewed.    - Relevant correspondence/notes from other specialty consultants were reviewed.    - Next Follow up in 6 months  - Patient was given treatment as per above plan

## 2024-08-19 ENCOUNTER — APPOINTMENT (OUTPATIENT)
Dept: CARDIOLOGY | Facility: CLINIC | Age: 74
End: 2024-08-19
Payer: MEDICARE

## 2024-08-19 VITALS
WEIGHT: 160 LBS | SYSTOLIC BLOOD PRESSURE: 130 MMHG | BODY MASS INDEX: 29.26 KG/M2 | DIASTOLIC BLOOD PRESSURE: 62 MMHG | HEART RATE: 68 BPM

## 2024-08-19 DIAGNOSIS — Z87.891 FORMER SMOKER: ICD-10-CM

## 2024-08-19 DIAGNOSIS — I10 ESSENTIAL HYPERTENSION, BENIGN: ICD-10-CM

## 2024-08-19 DIAGNOSIS — E78.2 MIXED HYPERLIPIDEMIA: ICD-10-CM

## 2024-08-19 PROCEDURE — 99214 OFFICE O/P EST MOD 30 MIN: CPT | Performed by: INTERNAL MEDICINE

## 2024-08-19 PROCEDURE — 3078F DIAST BP <80 MM HG: CPT | Performed by: INTERNAL MEDICINE

## 2024-08-19 PROCEDURE — 1159F MED LIST DOCD IN RCRD: CPT | Performed by: INTERNAL MEDICINE

## 2024-08-19 PROCEDURE — 1036F TOBACCO NON-USER: CPT | Performed by: INTERNAL MEDICINE

## 2024-08-19 PROCEDURE — 1123F ACP DISCUSS/DSCN MKR DOCD: CPT | Performed by: INTERNAL MEDICINE

## 2024-08-19 PROCEDURE — 3075F SYST BP GE 130 - 139MM HG: CPT | Performed by: INTERNAL MEDICINE

## 2024-08-19 NOTE — PROGRESS NOTES
Patient:  Gi Moss  YOB: 1950  MRN: [id number]       Chief Complaint/Active Symptoms:       Gi Moss is a 74 y.o. female who returns today for cardiac follow-up.  Patient says she is doing well.  Patient has longstanding chronic lung disease and wears full-time oxygen.  She has had no chest pain or shortness of breath since her last visit here.  We did adjust her medicines to better control blood pressure on her last visit and pressure is now well compensated.  She is ambulatory.  She has had no hospitalizations or change in condition since we last met      Objective:     Vitals:    08/19/24 1246   BP: 130/62   Pulse: 68       Vitals:    08/19/24 1246   Weight: 72.6 kg (160 lb)       Allergies:     Allergies   Allergen Reactions    Lisinopril Unknown          Medications:     Current Outpatient Medications   Medication Instructions    amLODIPine (NORVASC) 5 mg, oral, Daily    aspirin 81 mg EC tablet 1 tablet, oral, Daily    atorvastatin (LIPITOR) 40 mg, oral, Nightly    calcium carbonate 600 mg calcium (1,500 mg) tablet 2 tablets, oral, Daily    carvedilol (COREG) 25 mg, oral, 2 times daily    cholecalciferol (Vitamin D-3) 25 MCG (1000 UT) capsule oral    fish oil concentrate (Omega-3) 120-180 mg capsule 1 capsule, oral, 2 times daily    fluticasone (Flonase) 50 mcg/actuation nasal spray 1 spray, nasal, 2 times daily    fluticasone furoate-vilanteroL (Breo Ellipta) 100-25 mcg/dose inhaler 1 puff, inhalation, Daily    hydroCHLOROthiazide (HYDRODIURIL) 25 mg, oral, Daily    ipratropium-albuteroL (Combivent Respimat)  mcg/actuation inhaler inhalation    ipratropium-albuteroL (Duo-Neb) 0.5-2.5 mg/3 mL nebulizer solution inhalation    isosorbide mononitrate ER (Imdur) 30 mg 24 hr tablet 2 tablets, oral, Daily    losartan (Cozaar) 100 mg tablet 1 tablet, oral, Daily    methylPREDNISolone (Medrol Dospak) 4 mg tablets Take as directed on package.    mupirocin (Bactroban) 2 % ointment as  "directed prn    oxygen (O2) gas therapy 1 each, inhalation, Continuous    vitamin E 90 mg (200 unit) capsule 1 capsule, oral, Daily       Physical Examination:   Constitutional:       Appearance: Healthy appearance. Not in distress.   Neck:      Vascular: No JVR. JVD normal.   Pulmonary:      Effort: Pulmonary effort is normal.      Breath sounds: Normal breath sounds. No wheezing. No rhonchi. No rales.   Chest:      Chest wall: Not tender to palpatation.   Cardiovascular:      PMI at left midclavicular line. Normal rate. Regular rhythm. Normal S1. Normal S2.       Murmurs: There is no murmur.      No gallop.  No click. No rub.   Pulses:     Intact distal pulses.   Edema:     Peripheral edema absent.   Abdominal:      General: Bowel sounds are normal.      Palpations: Abdomen is soft.      Tenderness: There is no abdominal tenderness.   Musculoskeletal: Normal range of motion.         General: No tenderness. Skin:     General: Skin is warm and dry.   Neurological:      General: No focal deficit present.      Mental Status: Alert and oriented to person, place and time.            Lab:     CBC:   Lab Results   Component Value Date    WBC 5.4 07/18/2024    RBC 4.06 07/18/2024    HGB 12.2 07/18/2024    HCT 37.9 07/18/2024     07/18/2024        CMP:    Lab Results   Component Value Date     07/18/2024    K 3.8 07/18/2024    CL 98 07/18/2024    CO2 31 07/18/2024    BUN 24 (H) 07/18/2024    CREATININE 1.00 07/18/2024    GLUCOSE 82 07/18/2024    CALCIUM 10.5 (H) 07/18/2024       Magnesium:    No results found for: \"MG\"    Lipid Profile:    Lab Results   Component Value Date    TRIG 53 07/18/2024    HDL 82.1 07/18/2024    LDLCALC 60 07/18/2024       TSH:    Lab Results   Component Value Date    TSH 0.48 07/18/2024       BNP:   No results found for: \"BNP\"     PT/INR:    No results found for: \"PROTIME\", \"INR\"    HgBA1c:    No results found for: \"HGBA1C\"    BMP:  Lab Results   Component Value Date     " "07/18/2024     08/09/2023     05/21/2022     02/22/2021    K 3.8 07/18/2024    K 3.6 08/09/2023    K 3.4 (L) 05/21/2022    K 3.7 02/22/2021    CL 98 07/18/2024    CL 98 08/09/2023    CL 98 05/21/2022     02/22/2021    CO2 31 07/18/2024    CO2 30 08/09/2023    CO2 29 05/21/2022    CO2 27 02/22/2021    BUN 24 (H) 07/18/2024    BUN 20 08/09/2023    BUN 22 05/21/2022    BUN 28 (H) 02/22/2021    CREATININE 1.00 07/18/2024    CREATININE 0.92 08/09/2023    CREATININE 1.11 (H) 05/21/2022    CREATININE 1.10 (H) 02/22/2021       CBC:  Lab Results   Component Value Date    WBC 5.4 07/18/2024    WBC 4.9 08/09/2023    WBC 5.8 05/21/2022    WBC 6.5 02/22/2021    RBC 4.06 07/18/2024    RBC 4.28 08/09/2023    RBC 4.59 05/21/2022    RBC 4.28 02/22/2021    HGB 12.2 07/18/2024    HGB 12.9 08/09/2023    HGB 13.6 05/21/2022    HGB 12.5 02/22/2021    HCT 37.9 07/18/2024    HCT 39.1 08/09/2023    HCT 41.2 05/21/2022    HCT 39.3 02/22/2021    MCV 93 07/18/2024    MCV 91 08/09/2023    MCV 90 05/21/2022    MCV 92 02/22/2021    MCH 30.0 07/18/2024    MCHC 32.2 07/18/2024    MCHC 33.0 08/09/2023    MCHC 33.0 05/21/2022    MCHC 31.8 (L) 02/22/2021    RDW 14.6 (H) 07/18/2024    RDW 14.6 (H) 08/09/2023    RDW 14.8 (H) 05/21/2022    RDW 15.1 (H) 02/22/2021     07/18/2024     08/09/2023     05/21/2022     02/22/2021       Cardiac Enzymes:    No results found for: \"TROPHS\"    Hepatic Function Panel:    Lab Results   Component Value Date    ALKPHOS 56 07/18/2024    ALT 18 07/18/2024    AST 18 07/18/2024    PROT 6.8 07/18/2024    BILITOT 0.9 07/18/2024         Diagnostic Studies:     Patient was never admitted.    EKG:   No results found for: \"EKG\"      Radiology:     No orders to display       Assessment/Plan:         Patient Active Problem List   Diagnosis    Essential hypertension, benign    Takotsubo cardiomyopathy    Mixed hyperlipidemia    COPD (chronic obstructive pulmonary disease) (Multi)    " Asthma ([facility])    BMI 28.0-28.9,adult    Former smoker    Chronic skin ulcer, unspecified ulcer stage (Multi)    Angina pectoris (CMS-HCC)    Irritant contact dermatitis due to other agents         ASSESSMENT       73-year-old female here for follow-up. Has a remote history of Takotsubo cardiomyopathy which resolved. She has end-stage COPD and is on full-time oxygen therapy. She had a recent echo which confirms normal LV function. She does have pulmonary hypertension and some mild valve regurgitation. She has no new or unusual symptoms otherwise. She is on her usual medicines without difficulties.     Meds, vitals, examination as noted.     Chart reviewed in detail discussed the patient at length.     Impression:  Resolved Takotsubo cardiomyopathy  Severe COPD  Hypertension which is labile   Oxygen dependency  Former smoker  Obesity  Dyslipidemia.  PLAN     Recommendation:  Maintain current meds including aspirin as we had discussed  Continue oxygen therapy  See me back in approximately 6 months  Call should any problems issues or concerns arise  Follow-up with Dr. Johnson for her primary care

## 2024-08-19 NOTE — PATIENT INSTRUCTIONS
Continue same medications/treatment.  Patient educated on proper medication use.  Patient educated on risk factor modification.  Please bring any lab results from other providers/physicians to your next appointment.    Please bring all medicines, vitamins, and herbal supplements with you when you come to the office.    Prescriptions will not be filled unless you are compliant with your follow up appointments or have a follow up appointment scheduled as per instruction of your physician. Refills should be requested at the time of your visit.    Follow up in 6 months    I, TE MAS RN, AM SCRIBING FOR AND IN THE PRESENCE OF DR. SONDRA BULL, DO, FACC

## 2024-09-03 ENCOUNTER — TELEPHONE (OUTPATIENT)
Dept: CARDIOLOGY | Facility: CLINIC | Age: 74
End: 2024-09-03
Payer: MEDICARE

## 2024-09-03 NOTE — TELEPHONE ENCOUNTER
Patient called and LM stating that Friday her BP was 66/42 and she was lightheaded. Patient stated she quit taking the amlodipine 5 mg and this morning it is 105/60. Patient wants to know if she is to continue taking the amlodipine. Routed to Molly Monzon RN

## 2024-09-05 DIAGNOSIS — I10 ESSENTIAL HYPERTENSION, BENIGN: ICD-10-CM

## 2024-09-05 RX ORDER — CARVEDILOL 25 MG/1
25 TABLET ORAL 2 TIMES DAILY
Qty: 180 TABLET | Refills: 3 | Status: SHIPPED | OUTPATIENT
Start: 2024-09-05

## 2024-09-05 NOTE — TELEPHONE ENCOUNTER
Received request for prescription refills for patient.   Patient follows with Dorian Esposito D.O.     Request is for Carvedilol  Is patient currently on medication yes    Last OV 8/19/24  Next OV 2/17/25    Pended for signing and sent to provider

## 2024-10-14 DIAGNOSIS — I10 ESSENTIAL HYPERTENSION, BENIGN: ICD-10-CM

## 2024-10-15 RX ORDER — LOSARTAN POTASSIUM 100 MG/1
100 TABLET ORAL DAILY
Qty: 90 TABLET | Refills: 3 | Status: SHIPPED | OUTPATIENT
Start: 2024-10-15 | End: 2025-10-15

## 2024-10-15 NOTE — TELEPHONE ENCOUNTER
Received request for prescription refills for patient.   Patient follows with Dr. Esposito     Request is for Losartan 100mg QD  Is patient currently on medication yes    Last OV 8/19/24  Next OV 2/17/25    Pended for signing and sent to provider

## 2025-02-10 ENCOUNTER — APPOINTMENT (OUTPATIENT)
Dept: PRIMARY CARE | Facility: CLINIC | Age: 75
End: 2025-02-10
Payer: MEDICARE

## 2025-02-10 VITALS
HEIGHT: 62 IN | WEIGHT: 160.8 LBS | HEART RATE: 77 BPM | OXYGEN SATURATION: 93 % | BODY MASS INDEX: 29.59 KG/M2 | SYSTOLIC BLOOD PRESSURE: 120 MMHG | DIASTOLIC BLOOD PRESSURE: 74 MMHG | TEMPERATURE: 97.2 F

## 2025-02-10 DIAGNOSIS — I10 ESSENTIAL HYPERTENSION, BENIGN: ICD-10-CM

## 2025-02-10 DIAGNOSIS — Z13.29 SCREENING FOR THYROID DISORDER: ICD-10-CM

## 2025-02-10 DIAGNOSIS — M81.0 AGE-RELATED OSTEOPOROSIS WITHOUT CURRENT PATHOLOGICAL FRACTURE: ICD-10-CM

## 2025-02-10 DIAGNOSIS — J30.1 SEASONAL ALLERGIC RHINITIS DUE TO POLLEN: ICD-10-CM

## 2025-02-10 DIAGNOSIS — E78.2 MIXED HYPERLIPIDEMIA: ICD-10-CM

## 2025-02-10 DIAGNOSIS — N18.31 CHRONIC KIDNEY DISEASE, STAGE 3A (MULTI): ICD-10-CM

## 2025-02-10 DIAGNOSIS — L98.499 CHRONIC SKIN ULCER, UNSPECIFIED ULCER STAGE (MULTI): ICD-10-CM

## 2025-02-10 DIAGNOSIS — Z12.31 SCREENING MAMMOGRAM FOR BREAST CANCER: ICD-10-CM

## 2025-02-10 DIAGNOSIS — Z00.00 ENCOUNTER FOR SUBSEQUENT ANNUAL WELLNESS VISIT (AWV) IN MEDICARE PATIENT: Primary | ICD-10-CM

## 2025-02-10 DIAGNOSIS — Z13.89 SCREENING FOR BLOOD OR PROTEIN IN URINE: ICD-10-CM

## 2025-02-10 PROCEDURE — G0444 DEPRESSION SCREEN ANNUAL: HCPCS | Performed by: INTERNAL MEDICINE

## 2025-02-10 PROCEDURE — 3074F SYST BP LT 130 MM HG: CPT | Performed by: INTERNAL MEDICINE

## 2025-02-10 PROCEDURE — 1159F MED LIST DOCD IN RCRD: CPT | Performed by: INTERNAL MEDICINE

## 2025-02-10 PROCEDURE — G0439 PPPS, SUBSEQ VISIT: HCPCS | Performed by: INTERNAL MEDICINE

## 2025-02-10 PROCEDURE — 99214 OFFICE O/P EST MOD 30 MIN: CPT | Performed by: INTERNAL MEDICINE

## 2025-02-10 PROCEDURE — 1160F RVW MEDS BY RX/DR IN RCRD: CPT | Performed by: INTERNAL MEDICINE

## 2025-02-10 PROCEDURE — 1124F ACP DISCUSS-NO DSCNMKR DOCD: CPT | Performed by: INTERNAL MEDICINE

## 2025-02-10 PROCEDURE — 3078F DIAST BP <80 MM HG: CPT | Performed by: INTERNAL MEDICINE

## 2025-02-10 RX ORDER — FLUTICASONE PROPIONATE 50 MCG
1 SPRAY, SUSPENSION (ML) NASAL 2 TIMES DAILY
Qty: 16 G | Refills: 11 | Status: SHIPPED | OUTPATIENT
Start: 2025-02-10

## 2025-02-10 RX ORDER — HYDROCHLOROTHIAZIDE 25 MG/1
25 TABLET ORAL DAILY
Qty: 90 TABLET | Refills: 3 | Status: SHIPPED | OUTPATIENT
Start: 2025-02-10 | End: 2026-02-10

## 2025-02-10 ASSESSMENT — COLUMBIA-SUICIDE SEVERITY RATING SCALE - C-SSRS: 1. IN THE PAST MONTH, HAVE YOU WISHED YOU WERE DEAD OR WISHED YOU COULD GO TO SLEEP AND NOT WAKE UP?: NO

## 2025-02-17 ENCOUNTER — APPOINTMENT (OUTPATIENT)
Dept: CARDIOLOGY | Facility: CLINIC | Age: 75
End: 2025-02-17
Payer: MEDICARE

## 2025-03-18 ENCOUNTER — APPOINTMENT (OUTPATIENT)
Dept: CARDIOLOGY | Facility: CLINIC | Age: 75
End: 2025-03-18
Payer: MEDICARE

## 2025-03-18 VITALS
SYSTOLIC BLOOD PRESSURE: 136 MMHG | WEIGHT: 160.9 LBS | DIASTOLIC BLOOD PRESSURE: 66 MMHG | BODY MASS INDEX: 28.51 KG/M2 | HEIGHT: 63 IN | HEART RATE: 70 BPM

## 2025-03-18 DIAGNOSIS — Z87.891 FORMER SMOKER: ICD-10-CM

## 2025-03-18 DIAGNOSIS — I51.81 TAKOTSUBO CARDIOMYOPATHY: ICD-10-CM

## 2025-03-18 DIAGNOSIS — J44.9 CHRONIC OBSTRUCTIVE PULMONARY DISEASE, UNSPECIFIED COPD TYPE (MULTI): ICD-10-CM

## 2025-03-18 DIAGNOSIS — I10 ESSENTIAL HYPERTENSION, BENIGN: ICD-10-CM

## 2025-03-18 DIAGNOSIS — E78.2 MIXED HYPERLIPIDEMIA: ICD-10-CM

## 2025-03-18 DIAGNOSIS — N18.31 CHRONIC KIDNEY DISEASE, STAGE 3A (MULTI): ICD-10-CM

## 2025-03-18 PROCEDURE — 1036F TOBACCO NON-USER: CPT | Performed by: INTERNAL MEDICINE

## 2025-03-18 PROCEDURE — 3078F DIAST BP <80 MM HG: CPT | Performed by: INTERNAL MEDICINE

## 2025-03-18 PROCEDURE — 99214 OFFICE O/P EST MOD 30 MIN: CPT | Performed by: INTERNAL MEDICINE

## 2025-03-18 PROCEDURE — 1159F MED LIST DOCD IN RCRD: CPT | Performed by: INTERNAL MEDICINE

## 2025-03-18 PROCEDURE — 3075F SYST BP GE 130 - 139MM HG: CPT | Performed by: INTERNAL MEDICINE

## 2025-03-18 PROCEDURE — 1123F ACP DISCUSS/DSCN MKR DOCD: CPT | Performed by: INTERNAL MEDICINE

## 2025-03-18 RX ORDER — AMLODIPINE BESYLATE 5 MG/1
2.5 TABLET ORAL DAILY
Qty: 90 TABLET | Refills: 3 | Status: SHIPPED | OUTPATIENT
Start: 2025-03-18 | End: 2027-03-08

## 2025-03-18 RX ORDER — ATORVASTATIN CALCIUM 40 MG/1
40 TABLET, FILM COATED ORAL NIGHTLY
Qty: 90 TABLET | Refills: 3 | Status: SHIPPED | OUTPATIENT
Start: 2025-03-18 | End: 2026-03-18

## 2025-03-18 NOTE — PATIENT INSTRUCTIONS
Continue same medications/treatment.  Patient educated on proper medication use.  Patient educated on risk factor modification.  Please bring any lab results from other providers/physicians to your next appointment.    Please bring all medicines, vitamins, and herbal supplements with you when you come to the office.    Prescriptions will not be filled unless you are compliant with your follow up appointments or have a follow up appointment scheduled as per instruction of your physician. Refills should be requested at the time of your visit.    Follow up in September after testing  ECHO to be done in August  RESUME Norvasc 2.5 mg daily (cut your 5 mg tabs in 1/2)    ITE RN, AM SCRIBING FOR AND IN THE PRESENCE OF DR. SONDRA BULL, DO, FACC

## 2025-03-18 NOTE — PROGRESS NOTES
Patient:  Gi Moss  YOB: 1950  MRN: [id number]     Chief Complaint:  Chief Complaint   Patient presents with    Follow-up     6 month follow up for HTN and hyperlipidemia        HPI:       Gi Moss is a 74 y.o. female who returns today for cardiac follow-up.  Patient is doing well.  Patient has no new cardiovascular issues.  She is on her usual medications as before.  She has a history of Takotsubo cardiomyopathy which is resolved.  She also has hypertension hyperlipidemia and has chronic lung disease on full-time oxygen.  Plans are to repeat her echo at the end of the year and then see her at that time.      Objective:     Vitals:    03/18/25 1315   BP: 162/66   Pulse: 70       Wt Readings from Last 4 Encounters:   03/18/25 73 kg (160 lb 14.4 oz)   02/10/25 72.9 kg (160 lb 12.8 oz)   08/19/24 72.6 kg (160 lb)   08/06/24 71.8 kg (158 lb 3.2 oz)       Allergies:     Allergies   Allergen Reactions    Lisinopril Unknown        Medications:     Current Outpatient Medications   Medication Instructions    amLODIPine (NORVASC) 5 mg, oral, Daily    aspirin 81 mg EC tablet 1 tablet, Daily    atorvastatin (LIPITOR) 40 mg, oral, Nightly    calcium carbonate 600 mg calcium (1,500 mg) tablet 2 tablets, Daily    carvedilol (COREG) 25 mg, oral, 2 times daily    cholecalciferol (Vitamin D-3) 25 MCG (1000 UT) capsule Take by mouth.    fish oil concentrate (Omega-3) 120-180 mg capsule 1 capsule, 2 times daily    fluticasone (Flonase) 50 mcg/actuation nasal spray 1 spray, Each Nostril, 2 times daily    fluticasone furoate-vilanteroL (Breo Ellipta) 100-25 mcg/dose inhaler 1 puff, Daily    hydroCHLOROthiazide (HYDRODIURIL) 25 mg, oral, Daily    ipratropium-albuteroL (Combivent Respimat)  mcg/actuation inhaler Inhale.    ipratropium-albuteroL (Duo-Neb) 0.5-2.5 mg/3 mL nebulizer solution Inhale.    isosorbide mononitrate ER (Imdur) 30 mg 24 hr tablet 2 tablets, Daily    losartan (COZAAR) 100 mg, oral, Daily  "   mupirocin (Bactroban) 2 % ointment as directed prn    oxygen (O2) gas therapy 1 each, Continuous    vitamin E 90 mg (200 unit) capsule 1 capsule, Daily       Physical Examination:   GENERAL:  Well developed, well nourished, in no acute distress.  CHEST:  Symmetric and nontender.  NEURO/PSYCH:  Alert and oriented times three with approppriate behavior and responses.  NECK:  Supple, no JVD, no bruit.  LUNGS:  Clear to auscultation bilaterally, normal respiratory effort.  HEART:  Rate and rhythm regular with no evident murmur, no gallop appreciated.        There are no rubs, clicks or heaves.  EXTREMITIES:  Warm with good color, no clubbing or cyanosis.  There is no edema noted.  PERIPHERAL VASCULAR:  Pulses present and equally palpable; 2+ throughout.      Lab:     CBC:   Lab Results   Component Value Date    WBC 5.4 07/18/2024    RBC 4.06 07/18/2024    HGB 12.2 07/18/2024    HCT 37.9 07/18/2024     07/18/2024        CMP:    Lab Results   Component Value Date     07/18/2024    K 3.8 07/18/2024    CL 98 07/18/2024    CO2 31 07/18/2024    BUN 24 (H) 07/18/2024    CREATININE 1.00 07/18/2024    GLUCOSE 82 07/18/2024    CALCIUM 10.5 (H) 07/18/2024       Magnesium:    No results found for: \"MG\"    Lipid Profile:    Lab Results   Component Value Date    TRIG 53 07/18/2024    HDL 82.1 07/18/2024    LDLCALC 60 07/18/2024       TSH:    Lab Results   Component Value Date    TSH 0.48 07/18/2024       BNP:   No results found for: \"BNP\"     PT/INR:    No results found for: \"PROTIME\", \"INR\"    HgBA1c:    No results found for: \"HGBA1C\"    BMP:  Lab Results   Component Value Date     07/18/2024     08/09/2023     05/21/2022     02/22/2021    K 3.8 07/18/2024    K 3.6 08/09/2023    K 3.4 (L) 05/21/2022    K 3.7 02/22/2021    CL 98 07/18/2024    CL 98 08/09/2023    CL 98 05/21/2022     02/22/2021    CO2 31 07/18/2024    CO2 30 08/09/2023    CO2 29 05/21/2022    CO2 27 02/22/2021    BUN 24 " "(H) 07/18/2024    BUN 20 08/09/2023    BUN 22 05/21/2022    BUN 28 (H) 02/22/2021    CREATININE 1.00 07/18/2024    CREATININE 0.92 08/09/2023    CREATININE 1.11 (H) 05/21/2022    CREATININE 1.10 (H) 02/22/2021       CBC:  Lab Results   Component Value Date    WBC 5.4 07/18/2024    WBC 4.9 08/09/2023    WBC 5.8 05/21/2022    WBC 6.5 02/22/2021    RBC 4.06 07/18/2024    RBC 4.28 08/09/2023    RBC 4.59 05/21/2022    RBC 4.28 02/22/2021    HGB 12.2 07/18/2024    HGB 12.9 08/09/2023    HGB 13.6 05/21/2022    HGB 12.5 02/22/2021    HCT 37.9 07/18/2024    HCT 39.1 08/09/2023    HCT 41.2 05/21/2022    HCT 39.3 02/22/2021    MCV 93 07/18/2024    MCV 91 08/09/2023    MCV 90 05/21/2022    MCV 92 02/22/2021    MCH 30.0 07/18/2024    MCHC 32.2 07/18/2024    MCHC 33.0 08/09/2023    MCHC 33.0 05/21/2022    MCHC 31.8 (L) 02/22/2021    RDW 14.6 (H) 07/18/2024    RDW 14.6 (H) 08/09/2023    RDW 14.8 (H) 05/21/2022    RDW 15.1 (H) 02/22/2021     07/18/2024     08/09/2023     05/21/2022     02/22/2021       Cardiac Enzymes:    No results found for: \"TROPHS\"    Hepatic Function Panel:    Lab Results   Component Value Date    ALKPHOS 56 07/18/2024    ALT 18 07/18/2024    AST 18 07/18/2024    PROT 6.8 07/18/2024    BILITOT 0.9 07/18/2024       Diagnostic Studies:     Patient was never admitted.    Radiology:     No orders to display       Problem List:     Patient Active Problem List   Diagnosis    Essential hypertension, benign    Takotsubo cardiomyopathy    Mixed hyperlipidemia    COPD (chronic obstructive pulmonary disease) (Multi)    Asthma    BMI 28.0-28.9,adult    Former smoker    Chronic skin ulcer, unspecified ulcer stage (Multi)    Angina pectoris    Irritant contact dermatitis due to other agents    Chronic kidney disease, stage 3a (Multi)       Asessment:       74-year-old lady here for routine follow-up.    Meds, vitals, examination as noted.    Chart review details constipation at " length.    Impression:  Takotsubo cardiomyopathy resolved  Severe COPD  Stage III kidney disease  Hypertension  Dyslipidemia  Plan:   Recommendation:  Maintain usual meds  See me back in the fall  At that time we will do echo and EKG  Call if any other issues or problems arise  Follow-up with pulmonology      IMolly RN  am scribing for, and in the presence of Dr. Dorian Esposito DO .    IDr. Dorian DO , personally performed the services described in the documentation as scribed by Molly Monzon RN  in my presence, and confirm it is both accurate and complete.      Dr. Dorian Esposito DO   Thank you for allowing me to participate in the care of this patient. Please do not hesitate to contact me with any further questions or concerns.

## 2025-03-20 ENCOUNTER — TELEPHONE (OUTPATIENT)
Dept: CARDIOLOGY | Facility: CLINIC | Age: 75
End: 2025-03-20
Payer: MEDICARE

## 2025-03-20 DIAGNOSIS — I10 ESSENTIAL HYPERTENSION, BENIGN: ICD-10-CM

## 2025-03-20 DIAGNOSIS — I51.81 TAKOTSUBO CARDIOMYOPATHY: ICD-10-CM

## 2025-03-20 NOTE — TELEPHONE ENCOUNTER
Patient called to schedule a echo for Aug per Dr. Esposito and a FU visit in sept. Order is still not in for me to schedule bety echo. Please have order placed and then let me know to schedule

## 2025-03-30 NOTE — PROGRESS NOTES
"CHIEF COMPLAINT:   Annual Wellness Checkup       HISTORY OF PRESENT ILLNESS:   Gi Moss comes for annual medical check up.  No acute medical complaint today. Overall doing well. . Chronic medical conditions are stable with current medical management. Cognitive function is assessed. Immunizations are age appropriate. Home medications have been reconciled. The healthy lifestyle has been reinforced. Encouraged continued avoidance of tobacco, alcohol, poly pharmacy and substances. Functional capacity has been assessed. Discussed about fall risk and safety measures, at home and outside.  Age appropriate cancer screening tests were recommended. Reminded about code status and health care Power of  (POA). Discussed about mental health and wellbeing. The depression screening questionnaire  (PHQ 9) was discussed for 5 mins. Vital signs, recent lab results, imaging studies were reviewed. At the end patient raised the concern about chronic kidney disease, osteoporosis and seasonal allergy.  A complete physical exams was performed to evaluate those conditions.    Review of Systems   Constitutional:  No activity change or fever   HENT:  Denies ringing ears or nose bleed   Respiratory:  Denies stridor. No blood in sputum   Cardiovascular:  Denies chest pain, no sudden excessive sweating   Gastrointestinal:  No sour burping, no blood in stool    Genitourinary:  Denies blood in urine    Musculoskeletal:  No joint redness or swelling    Skin:  No new spot changing color or shape or border    Neurological:  No speech difficulty, facial droop    Psychiatric/Behavioral:  No agitation, denies Hallucination       Visit Vitals  /74 (BP Location: Left arm, Patient Position: Sitting)   Pulse 77   Temp 36.2 °C (97.2 °F) (Temporal)   Ht 1.575 m (5' 2\")   Wt 72.9 kg (160 lb 12.8 oz)   SpO2 93%   BMI 29.41 kg/m²   Smoking Status Former   BSA 1.79 m²           Wt Readings from Last 10 Encounters:   03/18/25 73 kg (160 lb 14.4 " oz)   02/10/25 72.9 kg (160 lb 12.8 oz)   08/19/24 72.6 kg (160 lb)   08/06/24 71.8 kg (158 lb 3.2 oz)   03/21/24 72 kg (158 lb 12.8 oz)   03/18/24 71.6 kg (157 lb 12.8 oz)   02/19/24 71.7 kg (158 lb)   09/18/23 71.7 kg (158 lb)   08/21/23 71.9 kg (158 lb 7 oz)   08/17/23 72.1 kg (159 lb)        PHYSICAL EXAM:  Gen: Alert, awake, Oriented X 3. Not in any acute distress   HEENT:  Atraumatic, PERRL.  Conjunctivae clear.   Moist nasal mucous membranes. oropharynx non erythematous,   Neck:  Supple without thyromegaly or lymphadenopathy.  Lungs:  Clear to auscultation without rales, rhonchi, or rub.  Heart:  RRR, S1, S2, without M.  Abdomen:  Soft, non tender, no organ enlargement, bruit. Bowel sounds present . No CVA tenderness.  Extremities:  No edema. No calf swelling or tenderness.    Skin:  No rash, ecchymosis or erythema.    RECENT LABS:  Lab Results   Component Value Date    WBC 5.4 07/18/2024    HGB 12.2 07/18/2024    HCT 37.9 07/18/2024     07/18/2024    CHOL 153 07/18/2024    TRIG 53 07/18/2024    HDL 82.1 07/18/2024    ALT 18 07/18/2024    AST 18 07/18/2024     07/18/2024    K 3.8 07/18/2024    CL 98 07/18/2024    CREATININE 1.00 07/18/2024    BUN 24 (H) 07/18/2024    CO2 31 07/18/2024    TSH 0.48 07/18/2024     Lab Results   Component Value Date    GLUCOSE 82 07/18/2024    CALCIUM 10.5 (H) 07/18/2024     07/18/2024    K 3.8 07/18/2024    CO2 31 07/18/2024    CL 98 07/18/2024    BUN 24 (H) 07/18/2024    CREATININE 1.00 07/18/2024      Lab Results   Component Value Date    LDLCALC 60 07/18/2024     Lab Results   Component Value Date    LDLCALC 60 07/18/2024    CREATININE 1.00 07/18/2024     MEDICATIONS:   Current Outpatient Medications   Medication Instructions    amLODIPine (NORVASC) 2.5 mg, oral, Daily    aspirin 81 mg EC tablet 1 tablet, Daily    atorvastatin (LIPITOR) 40 mg, oral, Nightly    calcium carbonate 600 mg calcium (1,500 mg) tablet 2 tablets, Daily    carvedilol (COREG) 25 mg,  oral, 2 times daily    cholecalciferol (Vitamin D-3) 25 MCG (1000 UT) capsule Take by mouth.    fish oil concentrate (Omega-3) 120-180 mg capsule 1 capsule, 2 times daily    fluticasone (Flonase) 50 mcg/actuation nasal spray 1 spray, Each Nostril, 2 times daily    fluticasone furoate-vilanteroL (Breo Ellipta) 100-25 mcg/dose inhaler 1 puff, Daily    hydroCHLOROthiazide (HYDRODIURIL) 25 mg, oral, Daily    ipratropium-albuteroL (Combivent Respimat)  mcg/actuation inhaler Inhale.    ipratropium-albuteroL (Duo-Neb) 0.5-2.5 mg/3 mL nebulizer solution Inhale.    isosorbide mononitrate ER (Imdur) 30 mg 24 hr tablet 2 tablets, Daily    losartan (COZAAR) 100 mg, oral, Daily    mupirocin (Bactroban) 2 % ointment as directed prn    oxygen (O2) gas therapy 1 each, Continuous    vitamin E 90 mg (200 unit) capsule 1 capsule, Daily        TODAY'S VISIT  DX:     1. Encounter for subsequent annual wellness visit (AWV) in Medicare patient  Overall health is stable and at base line. Will continue with current medical therapy and plan.  Immunizations, cancer screening tests are age appropriately up to date.      2. Chronic kidney disease, stage 3a (Multi)  Comprehensive Metabolic Panel    Comprehensive Metabolic Panel      3. Chronic skin ulcer, unspecified ulcer stage (Multi)  Stable at this time      4. Essential hypertension, benign  hydroCHLOROthiazide (HYDRODiuril) 25 mg tablet    CBC and Auto Differential    CBC and Auto Differential      5. Screening for thyroid disorder  Triiodothyronine, Free    TSH with reflex to Free T4 if abnormal    Triiodothyronine, Free    TSH with reflex to Free T4 if abnormal      6. Screening for blood or protein in urine  Urinalysis with Reflex Microscopic    Urinalysis with Reflex Microscopic      7. Screening mammogram for breast cancer  BI mammo bilateral screening tomosynthesis      8. Age-related osteoporosis without current pathological fracture  Vitamin D 25-Hydroxy,Total (for eval of  Vitamin D levels)    XR DEXA bone density    Vitamin D 25-Hydroxy,Total (for eval of Vitamin D levels)      9. Seasonal allergic rhinitis due to pollen  fluticasone (Flonase) 50 mcg/actuation nasal spray      10. Mixed hyperlipidemia  Lipid Panel    Lipid Panel             MEDICAL DECISION MAKING:   - Relevant correspondence/notes from specialty consultants were reviewed.  - Active co morbidities conditions are stable for now.    - Cognitive function stable.    - Immunizations are age appropriately up to date.   - Preventative cancer screening tests are up-to-date.    - Medications have been sent for refill  - F/U in 6 months      PS: This note was completed using Dragon voice recognition technology and may include unintended errors with respect to translation of words, typographical errors or grammar errors which may not have been identified while finalizing the chart.

## 2025-05-29 DIAGNOSIS — I10 ESSENTIAL HYPERTENSION, BENIGN: ICD-10-CM

## 2025-05-29 DIAGNOSIS — I51.81 TAKOTSUBO CARDIOMYOPATHY: ICD-10-CM

## 2025-05-29 RX ORDER — ISOSORBIDE MONONITRATE 30 MG/1
60 TABLET, EXTENDED RELEASE ORAL DAILY
Qty: 180 TABLET | Refills: 3 | Status: SHIPPED | OUTPATIENT
Start: 2025-05-29 | End: 2026-05-29

## 2025-05-29 NOTE — TELEPHONE ENCOUNTER
Received request for prescription refill for patient.  Patient follows with Dr. Dorian Esposito, DO, [facility]     Request is for isosorbide   Is patient currently on medication- yes    Last OV- 3/18/25  Next OV- 9/16/25    Pended for signing and sent to provider.

## 2025-08-05 ENCOUNTER — HOSPITAL ENCOUNTER (OUTPATIENT)
Dept: RADIOLOGY | Facility: HOSPITAL | Age: 75
Discharge: HOME | End: 2025-08-05
Payer: MEDICARE

## 2025-08-05 VITALS — HEIGHT: 63 IN | BODY MASS INDEX: 28.35 KG/M2 | WEIGHT: 160 LBS

## 2025-08-05 DIAGNOSIS — M81.0 AGE-RELATED OSTEOPOROSIS WITHOUT CURRENT PATHOLOGICAL FRACTURE: ICD-10-CM

## 2025-08-05 DIAGNOSIS — Z12.31 SCREENING MAMMOGRAM FOR BREAST CANCER: ICD-10-CM

## 2025-08-05 PROCEDURE — 77063 BREAST TOMOSYNTHESIS BI: CPT | Performed by: RADIOLOGY

## 2025-08-05 PROCEDURE — 77080 DXA BONE DENSITY AXIAL: CPT

## 2025-08-05 PROCEDURE — 77067 SCR MAMMO BI INCL CAD: CPT

## 2025-08-05 PROCEDURE — 77080 DXA BONE DENSITY AXIAL: CPT | Performed by: RADIOLOGY

## 2025-08-05 PROCEDURE — 77067 SCR MAMMO BI INCL CAD: CPT | Performed by: RADIOLOGY

## 2025-08-11 ENCOUNTER — APPOINTMENT (OUTPATIENT)
Dept: PRIMARY CARE | Facility: CLINIC | Age: 75
End: 2025-08-11
Payer: MEDICARE

## 2025-08-11 VITALS
DIASTOLIC BLOOD PRESSURE: 62 MMHG | WEIGHT: 159.2 LBS | TEMPERATURE: 98.4 F | BODY MASS INDEX: 28.21 KG/M2 | OXYGEN SATURATION: 97 % | SYSTOLIC BLOOD PRESSURE: 134 MMHG | HEIGHT: 63 IN | HEART RATE: 63 BPM

## 2025-08-11 DIAGNOSIS — I51.81 TAKOTSUBO CARDIOMYOPATHY: ICD-10-CM

## 2025-08-11 DIAGNOSIS — J43.2 CENTRILOBULAR EMPHYSEMA (MULTI): Primary | ICD-10-CM

## 2025-08-11 DIAGNOSIS — N18.31 CHRONIC KIDNEY DISEASE, STAGE 3A (MULTI): ICD-10-CM

## 2025-08-11 PROCEDURE — 3075F SYST BP GE 130 - 139MM HG: CPT | Performed by: INTERNAL MEDICINE

## 2025-08-11 PROCEDURE — G2211 COMPLEX E/M VISIT ADD ON: HCPCS | Performed by: INTERNAL MEDICINE

## 2025-08-11 PROCEDURE — 99214 OFFICE O/P EST MOD 30 MIN: CPT | Performed by: INTERNAL MEDICINE

## 2025-08-11 PROCEDURE — 1159F MED LIST DOCD IN RCRD: CPT | Performed by: INTERNAL MEDICINE

## 2025-08-11 PROCEDURE — 1160F RVW MEDS BY RX/DR IN RCRD: CPT | Performed by: INTERNAL MEDICINE

## 2025-08-11 PROCEDURE — 3078F DIAST BP <80 MM HG: CPT | Performed by: INTERNAL MEDICINE

## 2025-08-11 RX ORDER — CLOBETASOL PROPIONATE 0.5 MG/ML
SOLUTION TOPICAL 2 TIMES DAILY
COMMUNITY
Start: 2025-04-24

## 2025-08-11 RX ORDER — KETOCONAZOLE 20 MG/ML
1 SHAMPOO, SUSPENSION TOPICAL WEEKLY
COMMUNITY
Start: 2025-04-24

## 2025-08-11 ASSESSMENT — PATIENT HEALTH QUESTIONNAIRE - PHQ9: 2. FEELING DOWN, DEPRESSED OR HOPELESS: NOT AT ALL

## 2025-08-18 ENCOUNTER — HOSPITAL ENCOUNTER (OUTPATIENT)
Dept: CARDIOLOGY | Facility: CLINIC | Age: 75
Discharge: HOME | End: 2025-08-18
Payer: MEDICARE

## 2025-08-18 ENCOUNTER — APPOINTMENT (OUTPATIENT)
Dept: CARDIOLOGY | Facility: CLINIC | Age: 75
End: 2025-08-18
Payer: MEDICARE

## 2025-08-18 DIAGNOSIS — I10 ESSENTIAL HYPERTENSION, BENIGN: ICD-10-CM

## 2025-08-18 DIAGNOSIS — I51.81 TAKOTSUBO CARDIOMYOPATHY: ICD-10-CM

## 2025-08-18 LAB
AORTIC VALVE MEAN GRADIENT: 4 MMHG
AORTIC VALVE PEAK VELOCITY: 1.42 M/S
AV PEAK GRADIENT: 8 MMHG
AVA (PEAK VEL): 1.83 CM2
AVA (VTI): 1.96 CM2
EJECTION FRACTION APICAL 4 CHAMBER: 64.3
EJECTION FRACTION: 63 %
LEFT VENTRICLE INTERNAL DIMENSION DIASTOLE: 4.1 CM (ref 3.5–6)
LEFT VENTRICULAR OUTFLOW TRACT DIAMETER: 1.9 CM
LV EJECTION FRACTION BIPLANE: 65 %
MITRAL VALVE E/A RATIO: 0.86
MITRAL VALVE E/E' RATIO: 10.9
RIGHT VENTRICLE FREE WALL PEAK S': 12 CM/S
RIGHT VENTRICLE PEAK SYSTOLIC PRESSURE: 62 MMHG
TRICUSPID ANNULAR PLANE SYSTOLIC EXCURSION: 2.4 CM

## 2025-08-18 PROCEDURE — 93306 TTE W/DOPPLER COMPLETE: CPT | Performed by: INTERNAL MEDICINE

## 2025-08-18 PROCEDURE — 93306 TTE W/DOPPLER COMPLETE: CPT

## 2025-09-04 DIAGNOSIS — I10 ESSENTIAL HYPERTENSION, BENIGN: ICD-10-CM

## 2025-09-04 RX ORDER — CARVEDILOL 25 MG/1
25 TABLET ORAL 2 TIMES DAILY
Qty: 180 TABLET | Refills: 3 | Status: SHIPPED | OUTPATIENT
Start: 2025-09-04

## 2025-09-16 ENCOUNTER — APPOINTMENT (OUTPATIENT)
Dept: CARDIOLOGY | Facility: CLINIC | Age: 75
End: 2025-09-16
Payer: MEDICARE

## 2026-02-11 ENCOUNTER — APPOINTMENT (OUTPATIENT)
Dept: PRIMARY CARE | Facility: CLINIC | Age: 76
End: 2026-02-11
Payer: MEDICARE